# Patient Record
Sex: MALE | Race: WHITE | Employment: OTHER | URBAN - METROPOLITAN AREA
[De-identification: names, ages, dates, MRNs, and addresses within clinical notes are randomized per-mention and may not be internally consistent; named-entity substitution may affect disease eponyms.]

---

## 2024-01-02 ENCOUNTER — HOSPITAL ENCOUNTER (EMERGENCY)
Age: 89
Discharge: HOME | End: 2024-01-02
Attending: EMERGENCY MEDICINE

## 2024-01-02 ENCOUNTER — APPOINTMENT (OUTPATIENT)
Dept: GENERAL RADIOLOGY | Age: 89
End: 2024-01-02

## 2024-01-02 ENCOUNTER — APPOINTMENT (OUTPATIENT)
Dept: CT IMAGING | Age: 89
End: 2024-01-02

## 2024-01-02 VITALS
WEIGHT: 180 LBS | TEMPERATURE: 97 F | SYSTOLIC BLOOD PRESSURE: 123 MMHG | OXYGEN SATURATION: 96 % | DIASTOLIC BLOOD PRESSURE: 60 MMHG | HEIGHT: 69 IN | BODY MASS INDEX: 26.66 KG/M2 | HEART RATE: 73 BPM | RESPIRATION RATE: 18 BRPM

## 2024-01-02 DIAGNOSIS — R42 VERTIGO: ICD-10-CM

## 2024-01-02 DIAGNOSIS — R42 DIZZINESS: Primary | ICD-10-CM

## 2024-01-02 LAB
ALBUMIN SERPL-MCNC: 4.3 G/DL (ref 3.5–4.6)
ALP SERPL-CCNC: 100 U/L (ref 35–104)
ALT SERPL-CCNC: 49 U/L (ref 0–41)
ANION GAP SERPL CALCULATED.3IONS-SCNC: 13 MEQ/L (ref 9–15)
AST SERPL-CCNC: 51 U/L (ref 0–40)
BASOPHILS # BLD: 0 K/UL (ref 0–0.1)
BASOPHILS NFR BLD: 0.4 % (ref 0.2–1.2)
BILIRUB SERPL-MCNC: 0.5 MG/DL (ref 0.2–0.7)
BUN SERPL-MCNC: 21 MG/DL (ref 8–23)
CALCIUM SERPL-MCNC: 10.1 MG/DL (ref 8.5–9.9)
CHLORIDE SERPL-SCNC: 99 MEQ/L (ref 95–107)
CO2 SERPL-SCNC: 25 MEQ/L (ref 20–31)
CREAT SERPL-MCNC: 1.22 MG/DL (ref 0.7–1.2)
EOSINOPHIL # BLD: 0 K/UL (ref 0–0.5)
EOSINOPHIL NFR BLD: 0.3 % (ref 0.8–7)
ERYTHROCYTE [DISTWIDTH] IN BLOOD BY AUTOMATED COUNT: 12.4 % (ref 11.6–14.4)
GLOBULIN SER CALC-MCNC: 3 G/DL (ref 2.3–3.5)
GLUCOSE SERPL-MCNC: 160 MG/DL (ref 70–99)
HCT VFR BLD AUTO: 42.3 % (ref 42–52)
HGB BLD-MCNC: 14.1 G/DL (ref 13.7–17.5)
IMM GRANULOCYTES # BLD: 0.1 K/UL
IMM GRANULOCYTES NFR BLD: 0.8 %
LYMPHOCYTES # BLD: 0.8 K/UL (ref 1.3–3.6)
LYMPHOCYTES NFR BLD: 9.3 %
MCH RBC QN AUTO: 29.7 PG (ref 25.7–32.2)
MCHC RBC AUTO-ENTMCNC: 33.3 % (ref 32.3–36.5)
MCV RBC AUTO: 89.1 FL (ref 79–92.2)
MONOCYTES # BLD: 0.6 K/UL (ref 0.3–0.8)
MONOCYTES NFR BLD: 6.9 % (ref 5.3–12.2)
NEUTROPHILS # BLD: 7.4 K/UL (ref 1.8–5.4)
NEUTS SEG NFR BLD: 82.3 % (ref 34–67.9)
PLATELET # BLD AUTO: 223 K/UL (ref 163–337)
POTASSIUM SERPL-SCNC: 4.2 MEQ/L (ref 3.4–4.9)
PROT SERPL-MCNC: 7.3 G/DL (ref 6.3–8)
RBC # BLD AUTO: 4.75 M/UL (ref 4.63–6.08)
SARS-COV-2 RDRP RESP QL NAA+PROBE: NOT DETECTED
SODIUM SERPL-SCNC: 137 MEQ/L (ref 135–144)
TROPONIN, HIGH SENSITIVITY: 22 NG/L (ref 0–19)
TROPONIN, HIGH SENSITIVITY: 22 NG/L (ref 0–19)
TROPONIN, HIGH SENSITIVITY: 23 NG/L (ref 0–19)
WBC # BLD AUTO: 9 K/UL (ref 4.2–9)

## 2024-01-02 PROCEDURE — 80053 COMPREHEN METABOLIC PANEL: CPT

## 2024-01-02 PROCEDURE — 93005 ELECTROCARDIOGRAM TRACING: CPT

## 2024-01-02 PROCEDURE — 84484 ASSAY OF TROPONIN QUANT: CPT

## 2024-01-02 PROCEDURE — 36415 COLL VENOUS BLD VENIPUNCTURE: CPT

## 2024-01-02 PROCEDURE — 70450 CT HEAD/BRAIN W/O DYE: CPT

## 2024-01-02 PROCEDURE — 99285 EMERGENCY DEPT VISIT HI MDM: CPT

## 2024-01-02 PROCEDURE — 87635 SARS-COV-2 COVID-19 AMP PRB: CPT

## 2024-01-02 PROCEDURE — 71045 X-RAY EXAM CHEST 1 VIEW: CPT

## 2024-01-02 PROCEDURE — 85025 COMPLETE CBC W/AUTO DIFF WBC: CPT

## 2024-01-02 PROCEDURE — 2580000003 HC RX 258: Performed by: EMERGENCY MEDICINE

## 2024-01-02 PROCEDURE — 6370000000 HC RX 637 (ALT 250 FOR IP): Performed by: EMERGENCY MEDICINE

## 2024-01-02 RX ORDER — ONDANSETRON 4 MG/1
4 TABLET, ORALLY DISINTEGRATING ORAL EVERY 4 HOURS PRN
Qty: 8 TABLET | Refills: 0 | Status: SHIPPED | OUTPATIENT
Start: 2024-01-02

## 2024-01-02 RX ORDER — MECLIZINE HCL 12.5 MG/1
25 TABLET ORAL ONCE
Status: COMPLETED | OUTPATIENT
Start: 2024-01-02 | End: 2024-01-02

## 2024-01-02 RX ORDER — 0.9 % SODIUM CHLORIDE 0.9 %
500 INTRAVENOUS SOLUTION INTRAVENOUS ONCE
Status: COMPLETED | OUTPATIENT
Start: 2024-01-02 | End: 2024-01-02

## 2024-01-02 RX ORDER — MECLIZINE HYDROCHLORIDE 25 MG/1
25 TABLET ORAL EVERY 6 HOURS PRN
Qty: 15 TABLET | Refills: 0 | Status: SHIPPED | OUTPATIENT
Start: 2024-01-02 | End: 2024-01-06

## 2024-01-02 RX ADMIN — MECLIZINE 25 MG: 12.5 TABLET ORAL at 16:22

## 2024-01-02 RX ADMIN — SODIUM CHLORIDE 500 ML: 9 INJECTION, SOLUTION INTRAVENOUS at 16:24

## 2024-01-02 ASSESSMENT — LIFESTYLE VARIABLES
HOW OFTEN DO YOU HAVE A DRINK CONTAINING ALCOHOL: NEVER
HOW MANY STANDARD DRINKS CONTAINING ALCOHOL DO YOU HAVE ON A TYPICAL DAY: PATIENT DOES NOT DRINK

## 2024-01-02 ASSESSMENT — ENCOUNTER SYMPTOMS
COUGH: 0
NAUSEA: 1
VOMITING: 1
BACK PAIN: 0
SORE THROAT: 0
EYE DISCHARGE: 0
SHORTNESS OF BREATH: 0
ABDOMINAL PAIN: 0
DIARRHEA: 1
EYE REDNESS: 0
COLOR CHANGE: 0
SINUS PAIN: 0

## 2024-01-02 ASSESSMENT — PAIN - FUNCTIONAL ASSESSMENT: PAIN_FUNCTIONAL_ASSESSMENT: NONE - DENIES PAIN

## 2024-01-02 NOTE — ED TRIAGE NOTES
Pt a/o x 3 skin pink w/d resp non labored. Pt reports dizziness yesterday and was unsteady on feet. Pt this am had diarrhea and small emesis. Pt denies pain. Neuro unremarkable.pt to have PET scan tomorrow to look for metastatic prostate ca.

## 2024-01-02 NOTE — ED PROVIDER NOTES
10/10/2023), initial troponin elevated 22, COVID screening is negative  Repeat troponin level was sent stable at 22  Third troponin was sent and pending    All other labs were within normal range or not returned as of this dictation.    EMERGENCY DEPARTMENT COURSE and DIFFERENTIAL DIAGNOSIS/MDM:   Vitals:    Vitals:    01/02/24 1459 01/02/24 1617   BP: (!) 145/87    Pulse: 71    Resp: 17 16   Temp: 97 °F (36.1 °C)    TempSrc: Oral    SpO2: 100% 97%   Weight: 81.6 kg (180 lb)    Height: 1.753 m (5' 9\")      Orthostatic Vitals:      1/2/2024     4:17 PM   Orthostatic Vitals   Orthostatic B/P and Pulse? Yes   Blood Pressure Lying 133/72   Pulse Lying 55 PER MINUTE   Blood Pressure Sitting 155/70   Pulse Sitting 58 PER MINUTE   Blood Pressure Standing 126/71   Pulse Standing 60 PER MINUTE        Treatment and course: Patient was placed on a cardiac monitor with continuous pulse ox monitoring and IV was established normal saline 500 mL IV bolus was given along with Antivert 25 mg p.o., orthostatic blood pressures were obtained and stable    Resting comfortably asymptomatic throughout the emergency department course neurologically intact.  Repeat troponin is stable (awaiting 3rd troponin) EKG is normal patient stable for outpatient follow-up    With the oncoming ER physician in the event there is elevation in the troponin to intervene otherwise patient will be set for discharge home I did prepare home-going instruction and prescription.    FINAL IMPRESSION      1. Dizziness    2. Vertigo          DISPOSITION/PLAN   DISPOSITION Decision To Discharge 01/02/2024 07:21:16 PM  Patient set for discharge home if 3rd troponin remains stable, advised rest increase fluids rise slowly from a seated position going prescription for Antivert was written monitor closely and return if any change or worsening any headache double blurry vision recurrent/worsening dizziness chest pain palpitations.  Patient to follow-up with his primary

## 2024-01-03 LAB
EKG ATRIAL RATE: 60 BPM
EKG P AXIS: 79 DEGREES
EKG P-R INTERVAL: 196 MS
EKG Q-T INTERVAL: 436 MS
EKG QRS DURATION: 96 MS
EKG QTC CALCULATION (BAZETT): 436 MS
EKG R AXIS: -25 DEGREES
EKG T AXIS: 15 DEGREES
EKG VENTRICULAR RATE: 60 BPM

## 2024-01-03 NOTE — ED NOTES
First time RN to see pt   Pt is alert and oriented     Pt in no distress at time of discharge     Discharge instructions given to and explained to pt. Pt verbalized understanding and denies questions at this time.  Pt stable at discharge.      IV dc'd cath intact.  No signs of redness, swelling, or bleeding.  Dressing applied.

## 2025-02-04 ENCOUNTER — HOSPITAL ENCOUNTER (OUTPATIENT)
Dept: PHYSICAL THERAPY | Age: 89
Setting detail: THERAPIES SERIES
Discharge: HOME | End: 2025-02-04
Payer: MEDICARE

## 2025-02-04 PROCEDURE — 97110 THERAPEUTIC EXERCISES: CPT

## 2025-02-04 PROCEDURE — 97162 PT EVAL MOD COMPLEX 30 MIN: CPT

## 2025-02-04 PROCEDURE — 97530 THERAPEUTIC ACTIVITIES: CPT

## 2025-02-04 ASSESSMENT — PAIN SCALES - GENERAL: PAINLEVEL_OUTOF10: 0

## 2025-02-04 ASSESSMENT — PAIN DESCRIPTION - LOCATION: LOCATION: ANKLE

## 2025-02-04 ASSESSMENT — PAIN DESCRIPTION - ORIENTATION: ORIENTATION: RIGHT

## 2025-02-04 ASSESSMENT — PAIN DESCRIPTION - DESCRIPTORS: DESCRIPTORS: ACHING

## 2025-02-04 NOTE — PROGRESS NOTES
Therapy is both indicated and medically necessary as outlined in the POC to increase the likelihood of meeting the functionally related goals stated below.     Patient's Activity Tolerance:        Patient's rehabilitation potential/prognosis is considered to be: Good    Factors which may impact rehabilitation potential include:          GOALS   Patient Goal(s): improve strength and balance and avoid surgery on R ankle if possible  Short Term Goals Completed by 2-3 wks Goal Status   Pt demonstrate I with HEP and report compliance with HEP 4/7 days per wk     Pt amb 200 feet or greater with appropriate AD demonstrating good balance and near equal step length                                                         Long Term Goals Completed by 4-6 wks Goal Status   Imrpove FAAM from 45 % at time of IE to 25% or less at time of D/C     Improve LE strength 4 to 4+/5 or greater to improve walking ability     Pt amb 500 feet or greater distance with least restrictive device demonstrating good balance     Pt report no falls and no major LOB within a 4 week period     Pt I with advanced HEP to further improve strength and balance     Improve SLS 5 sec or greater R and L LE without UE support to imrpove overall balance                                  TREATMENT PLAN            Pt. actively involved in establishing Plan of Care and Goals: Yes  Patient/ Caregiver education and instruction: Goals, PT Role, Plan of Care, Evaluative findings, Home Exercise Program, Fall prevention strategies             Treatment may include any combination of the following: Current Treatment Recommendations: Strengthening, Balance training, Functional mobility training, Home exercise program, Gait training, Safety education & training, Patient/Caregiver education & training, Manual, Neuromuscular re-education     Frequency / Duration:  Patient to be seen 1-2x per wk for 4-6 wks weeks      Eval Complexity: Overall Evaluation : Medium       PT

## 2025-02-06 ENCOUNTER — HOSPITAL ENCOUNTER (OUTPATIENT)
Dept: PHYSICAL THERAPY | Age: 89
Setting detail: THERAPIES SERIES
Discharge: HOME | End: 2025-02-06
Payer: MEDICARE

## 2025-02-06 PROCEDURE — 97110 THERAPEUTIC EXERCISES: CPT

## 2025-02-06 PROCEDURE — 97112 NEUROMUSCULAR REEDUCATION: CPT

## 2025-02-06 ASSESSMENT — PAIN SCALES - GENERAL: PAINLEVEL_OUTOF10: 0

## 2025-02-06 NOTE — PROGRESS NOTES
Physical Therapy: Daily Note   Patient: Prasanna Dodd (89 y.o. male)   Examination Date: 2025  Plan of Care/Certification Expiration Date: 25    No data recorded   :  1934 # of Visits since SOC:   2   MRN: [id number]  CSN: [id number] Start of Care Date:   2025   Insurance: Payor: [employer] / Plan: [employer] SELECT PPO / Product Type: *No Product type* /   Insurance ID: [id number] - (Medicare Managed) Secondary Insurance (if applicable):    Referring Physician: Raymon Da Silva MD Dr. Elcher   PCP: Anup Smith MD Visits to Date/Visits Approved: 2 / 10    No Show/Cancelled Appts: 0 / 0     Medical Diagnosis: Malignant neoplasm of prostate [C61]  Pain in right ankle and joints of right foot [M25.571] prostate CA, chronic apain R ankle  Treatment Diagnosis: Chronic R ankle pain, ankle weakness, unsteady gait        SUBJECTIVE EXAMINATION   Pain Level: Pain Screening  Pain Assessment: 0-10  Pain Level: 0    Patient Comments: Subjective: Pt reports no pain upon starting therapy session. Pt brought band and HEP to review.    HEP Compliance: Good        OBJECTIVE EXAMINATION   Restrictions:  No data recorded No data recorded No data recorded              TREATMENT     Exercises:  Therapeutic exercise (CPT 75984)   Treatment Reasoning    Exercise 1: Tband pf x 15 H 3 GTB  Exercise 2: Tband df x 15 H 3 (GTB  Exercise 3: TBand inv x 15 GTB H 3  Exercise 4: TBand ev x 15 GTB H 3  Exercise 5: SLR x 10 H3 R and L  Exercise 6: BAPS L2 x15 DF/PF, clockwise, counterclockwise x 15 ea.  Exercise 7: Mini squats x 15 with UE support  Exercise 8: hip AB x10 ea with UE support  Exercise 9: seated ankle PFL isometrics against ball x 15                          Neuromuscular Re-Education: (CPT 78533) Treatment Reasoning    Neuromuscular Re-education (CPT 33766)  Neuro Re-Ed Exercise 1: SLS x15 sec for 3 sets B LE CGA/SBA for safety and UE support  Neuro Re-Ed Exercise 2: tandem

## 2025-02-10 ENCOUNTER — HOSPITAL ENCOUNTER (OUTPATIENT)
Dept: PHYSICAL THERAPY | Age: 89
Setting detail: THERAPIES SERIES
Discharge: HOME | End: 2025-02-10
Payer: MEDICARE

## 2025-02-10 PROCEDURE — 97110 THERAPEUTIC EXERCISES: CPT

## 2025-02-10 NOTE — PROGRESS NOTES
Physical Therapy: Daily Note   Patient: Prasanna Dodd (89 y.o. male)   Examination Date: 02/10/2025  Plan of Care/Certification Expiration Date: 25    No data recorded   :  1934 # of Visits since SOC:   3   MRN: [id number]  CSN: [id number] Start of Care Date:   2025   Insurance: Payor: [employer] / Plan: [employer] SELECT PPO / Product Type: *No Product type* /   Insurance ID: [id number] - (Medicare Managed) Secondary Insurance (if applicable):    Referring Physician: Raymon Da Silva MD Dr. Elcher   PCP: Anup Smith MD Visits to Date/Visits Approved: 3 / 10    No Show/Cancelled Appts: 0 / 0     Medical Diagnosis: Malignant neoplasm of prostate [C61]  Pain in right ankle and joints of right foot [M25.571]    Treatment Diagnosis: Chronic R ankle pain, ankle weakness, unsteady gait        SUBJECTIVE EXAMINATION   Pain Level:      Patient Comments: Subjective: Pt reports no c/o pain.    HEP Compliance: Good        OBJECTIVE EXAMINATION   Restrictions:  No data recorded No data recorded No data recorded      TREATMENT     Exercises:      Treatment Reasoning    Exercise 1: Tband pf x 15 H 3 GTB  Exercise 2: Tband df x 15 H 3 (GTB)  Exercise 3: TBand inv x 15 GTB H 3  Exercise 4: TBand ev x 15 GTB H 3  Exercise 5: SLR x 15 H3 R and L  Exercise 6: BAPS L2 x15 DF/PF,IV/EV clockwise, counterclockwise x 15 ea.  Exercise 10: *VMO x 10 B LE  Exercise 11: *Heel/toe raises x 10  Exercise 12: *Sidelying abduction x 10 B LE  Exercise 13: *SLS x 2-3 without UE support; with 1-2 fingers on blue pad     Strength and balance                      Pt Education:  Given HEP        ASSESSMENT     Assessment: Assessment: Pt arrives to his appt with no c/o pain. Pt report he feel more weakness than pain. Pt performed his Tband ther ex needing occasion cues for set up. PT then added 2 additonal supine (VMO and sidelying ABD) and then progress with balance on blue pain. Also added standing

## 2025-02-14 ENCOUNTER — HOSPITAL ENCOUNTER (OUTPATIENT)
Dept: PHYSICAL THERAPY | Age: 89
Setting detail: THERAPIES SERIES
Discharge: HOME | End: 2025-02-14
Payer: MEDICARE

## 2025-02-14 PROCEDURE — 97112 NEUROMUSCULAR REEDUCATION: CPT

## 2025-02-14 PROCEDURE — 97110 THERAPEUTIC EXERCISES: CPT

## 2025-02-14 NOTE — PROGRESS NOTES
Physical Therapy: Daily Note   Patient: Prasanna Dodd (89 y.o. male)   Examination Date: 2025  Plan of Care/Certification Expiration Date: 25    No data recorded   :  1934 # of Visits since SOC:   4   MRN: [id number]  CSN: [id number] Start of Care Date:   2025   Insurance: Payor: [employer]AGE / Plan: [employer] SELECT PPO / Product Type: *No Product type* /   Insurance ID: [id number] - (Medicare Managed) Secondary Insurance (if applicable):    Referring Physician: Raymon Da Silva MD     PCP: Anup Smith MD Visits to Date/Visits Approved: 4 / 10    No Show/Cancelled Appts: 0  0     Medical Diagnosis: Malignant neoplasm of prostate [C61]  Pain in right ankle and joints of right foot [M25.571]    Treatment Diagnosis: Chronic R ankle pain, ankle weakness, unsteady gait        SUBJECTIVE EXAMINATION   Pain Level:      Patient Comments: Subjective: Pt presetns to therpay with no complaints of pain, reports mild decrease in ankle swelling and maybe noticing slight increased strength in ankle    HEP Compliance: Good        OBJECTIVE EXAMINATION       TREATMENT     Exercises:      Treatment Reasoning    Exercise 1: Tband pf x 10 H 3 GTB  Exercise 2: Tband df x 10 H 3 (GTB)  Exercise 3: TBand inv x 10 GTB H 3  Exercise 4: TBand ev x 10 GTB H 3-vc and tactile cues for form  Exercise 5: SLR x 10 H3 R and L  Exercise 6: BAPS L2 x10-15  DF/PF,IV/EV clockwise, counterclockwise x 10-15 ea.  Exercise 7: Mini squats x 10 with light UE support  Exercise 8: sidelying hip abd x 10- vcs and tactile for form  Exercise 10: *VMO x 10 B LE  Exercise 11: *Heel/toe raises x 10 H 2-3 fingertips support for balance  Exercise 12: sidelying hip abd x 10- vcs and tactile for form                          Neuromuscular Re-Education: (CPT 08616) Treatment Reasoning    Neuromuscular Re-education (CPT 94956)  Neuro Re-Ed Exercise 1: SLS on blue foam R and L with fingertips supprot as needed and

## 2025-02-17 ENCOUNTER — APPOINTMENT (OUTPATIENT)
Dept: PHYSICAL THERAPY | Age: 89
End: 2025-02-17
Payer: MEDICARE

## 2025-02-18 ENCOUNTER — HOSPITAL ENCOUNTER (OUTPATIENT)
Dept: PHYSICAL THERAPY | Age: 89
Setting detail: THERAPIES SERIES
Discharge: HOME | End: 2025-02-18
Payer: MEDICARE

## 2025-02-18 PROCEDURE — 97110 THERAPEUTIC EXERCISES: CPT

## 2025-02-18 PROCEDURE — 97140 MANUAL THERAPY 1/> REGIONS: CPT

## 2025-02-18 PROCEDURE — 97116 GAIT TRAINING THERAPY: CPT

## 2025-02-18 NOTE — PROGRESS NOTES
Physical Therapy  Physical Therapy: Daily Note   Patient: Prasanna Dodd (89 y.o. male)   Examination Date: 2025  Plan of Care/Certification Expiration Date: 25    No data recorded   :  1934 # of Visits since SOC:   5   MRN: [id number]  CSN: [id number] Start of Care Date:   2025   Insurance: Payor: [employer] MEDICARE ADVANTAGE / Plan: [employer] SELECT PPO / Product Type: *No Product type* /   Insurance ID: [id number] - (Medicare Managed) Secondary Insurance (if applicable):    Referring Physician: Raymon Da Silva MD Dr. Elcher   PCP: Anup Smith MD Visits to Date/Visits Approved: 5 / 10    No Show/Cancelled Appts: 0  0     Medical Diagnosis: Malignant neoplasm of prostate [C61]  Pain in right ankle and joints of right foot [M25.571]    Treatment Diagnosis: Chronic R ankle pain, ankle weakness, unsteady gait        SUBJECTIVE EXAMINATION   Pain Level: Pain Screening  Patient Currently in Pain: No    Patient Comments: Subjective: Pt. states his exercises are going well.  Reports walked in with walking stick but in the waiting room.    HEP Compliance: Good        OBJECTIVE EXAMINATION   Restrictions:  No data recorded No data recorded No data recorded              TREATMENT     Exercises:      Treatment Reasoning    Exercise 1: Tband pf x 10 H 3 GTB  Exercise 2: Tband df x 10 H 3 (GTB)  Exercise 3: TBand inv x 10 GTB H 3  Exercise 4: TBand ev x 10 GTB H 3-vc and tactile cues for form  Exercise 5: SLR x 10 H3 R and L  Exercise 8: sidelying hip abd x 10- vcs and tactile for form  Exercise 10: *VMO x 10 B LE    Limitations addressed: Mobility, Strength                     Neuromuscular Re-Education: (CPT 52499) Treatment Reasoning    Neuromuscular Re-education (CPT 80371)  Neuro Re-Ed Exercise 4: Balance activie on blue mat (one large thin blue mat and one thicker more uneven blue mat going from one surface to the next /uneven surface- forward retro walking side stepping and

## 2025-02-20 ENCOUNTER — HOSPITAL ENCOUNTER (OUTPATIENT)
Dept: PHYSICAL THERAPY | Age: 89
Setting detail: THERAPIES SERIES
Discharge: HOME | End: 2025-02-20
Payer: MEDICARE

## 2025-02-20 PROCEDURE — 97112 NEUROMUSCULAR REEDUCATION: CPT

## 2025-02-20 PROCEDURE — 97110 THERAPEUTIC EXERCISES: CPT

## 2025-02-20 NOTE — PROGRESS NOTES
Physical Therapy: Daily Note   Patient: Prasanna Dodd (89 y.o. male)   Examination Date: 2025  Plan of Care/Certification Expiration Date: 25    No data recorded   :  1934 # of Visits since SOC:   6   MRN: [id number]  CSN: [id number] Start of Care Date:   2025   Insurance: Payor: [employer]AGE / Plan: [employer]AGE SELECT PPO / Product Type: *No Product type* /   Insurance ID: [id number] - (Medicare Managed) Secondary Insurance (if applicable):    Referring Physician: Raymon Da Silva MD Dr. Elcher   PCP: Anup Smith MD Visits to Date/Visits Approved: 6 / 10    No Show/Cancelled Appts: 0 / 0     Medical Diagnosis: Malignant neoplasm of prostate (HCC) [C61]  Pain in right ankle and joints of right foot [M25.571]    Treatment Diagnosis: Chronic R ankle pain, ankle weakness, unsteady gait        SUBJECTIVE EXAMINATION   Pain Level:      Patient Comments: Subjective: Pt states, \"Balance is a huge issue.\" Pt states he has been doing his HEP on days he does not have therapy.    HEP Compliance: Good            TREATMENT     Exercises:      Treatment Reasoning    Exercise 7: Mini squats BTB  x 10 with light UE support  Exercise 11: *Heel/toe raises x 10 H 3-5'' fingertips support for balance  Exercise 13: SLS 0 UE assist 9'' at best LLE, RLE 2'' at best  Exercise 14: tandem stance;  LLE behind 10'' at best, RLE behind 18'' at best  Exercise 15: NBOS standing on blue pad 0 UE assist with head flex/ext/roations 2 LOB's with self correction  Exercise 16: fwd/retro lateral gait YTB at knees  x 10  Exercise 17: B shoulder extension H3'' x 10 RTB wide GERTRUDE  Exercise 18: mid rows with NBOS RTB H3'' x 10   Nautilus 4 way resisted walking 20# x 5 ea direction Limitations addressed: Mobility, Strength                     Pt Education: Additional Comments: active metastatic prostate CA, taking hormone therapy for treatment       ASSESSMENT     Assessment: Assessment: Pt progressing

## 2025-02-21 ENCOUNTER — APPOINTMENT (OUTPATIENT)
Dept: PHYSICAL THERAPY | Age: 89
End: 2025-02-21
Payer: MEDICARE

## 2025-02-25 ENCOUNTER — HOSPITAL ENCOUNTER (OUTPATIENT)
Dept: PHYSICAL THERAPY | Age: 89
Setting detail: THERAPIES SERIES
Discharge: HOME | End: 2025-02-25
Payer: MEDICARE

## 2025-02-25 PROCEDURE — 97110 THERAPEUTIC EXERCISES: CPT

## 2025-02-25 PROCEDURE — 97140 MANUAL THERAPY 1/> REGIONS: CPT

## 2025-02-25 NOTE — PROGRESS NOTES
Physical Therapy  Daily Treatment Note  Date: 2025  Patient Name: Prasanna Dodd  MRN: [id number]     :   1934    Referring Physician: Raymon Da Silva MD Dr. Elcher   PCP: Anup Smith MD    Medical Diagnosis: Malignant neoplasm of prostate (HCC) [C61]  Pain in right ankle and joints of right foot [M25.571]    Treatment Diagnosis: Chronic R ankle pain, ankle weakness, unsteady gait      Insurance: Payor: [employer]AGE / Plan: [employer] SELECT PPO / Product Type: *No Product type* /   Insurance ID: [id number] - (Medicare Managed)    Subjective:  General  Referring Provider (secondary): Dr. Bruner  PT Visit Information  Total # of Visits Approved: 10  Total # of Visits to Date: 7  Plan of Care/Certification Expiration Date: 25  No Show: 0  Canceled Appointment: 0  Referring Provider (secondary): Dr. Bruner  Subjective  Subjective: Pt. states he removed the tape on Saturday and felt good on with good support.  Pt. denies falls. Reports overdid it on Saturday with trying to stand on his toes.  \"I recovered.\"  Pain Screening  Patient Currently in Pain: No       Treatment Activities:  Exercises:      Treatment Reasoning    Exercise 1: Tband pf x 15 H 3 BTB  Exercise 2: Tband df x 15 H 3 BTB  Exercise 3: TBand inv x 15 BTB H 3  Exercise 4: TBand ev x 15 BTB H 3  Exercise 6: BAPS L4 x10 DF/PF,IV/EV clockwise, counterclockwise x 10 ea.  Exercise 13: SLS x 10\" occ BUE/ light UE support x 5 ea  Exercise 14: tandem stance 10-15 sec x 2 B  Exercise 16: fwd/retro lateral gait YTB at knees  x 10 LOB x 2 CGA and use of rail to correct    Limitations addressed: Mobility, Strength                     Manual Therapy: (CPT 02174) Treatment Reasoning     Other: KT tape to R ankle stirrup crossed in front of ankle and up malleoli 50% tension.  2 nd strip under foot with proximal pull up lateral and medial malleoli 50% tension.  3rd strip horizontal across B malleoli 100% tension for

## 2025-02-27 ENCOUNTER — HOSPITAL ENCOUNTER (OUTPATIENT)
Dept: PHYSICAL THERAPY | Age: 89
Setting detail: THERAPIES SERIES
Discharge: HOME | End: 2025-02-27
Payer: MEDICARE

## 2025-02-27 NOTE — PROGRESS NOTES
Physical Therapy  Daily Treatment Note  Date: 2025  Patient Name: Prasanna Dodd  MRN: [id number]     :   1934    Referring Physician: Raymon Da Silva MD Dr. Elcher   PCP: Anup Smith MD    Medical Diagnosis: Malignant neoplasm of prostate (HCC) [C61]  Pain in right ankle and joints of right foot [M25.571]    Treatment Diagnosis: Chronic R ankle pain, ankle weakness, unsteady gait      Insurance: Payor: [employer]AGE / Plan: [employer] SELECT PPO / Product Type: *No Product type* /   Insurance ID: [id number] - (Medicare Managed)    Subjective:  General  Referring Provider (secondary): Dr. Bruner  PT Visit Information  Total # of Visits Approved: 10  Total # of Visits to Date: 7  Plan of Care/Certification Expiration Date: 25  No Show: 0  Canceled Appointment: 1  Referring Provider (secondary): Dr. Bruner  Subjective     Pt. cancelled he did something to his knee.            Therapy Time:   Individual Concurrent Group Co-treatment   Time In           Time Out           Minutes  0                 Jennifer Stuart PTA

## 2025-03-04 ENCOUNTER — HOSPITAL ENCOUNTER (OUTPATIENT)
Dept: PHYSICAL THERAPY | Age: 89
Setting detail: THERAPIES SERIES
Discharge: HOME | End: 2025-03-04
Payer: MEDICARE

## 2025-03-04 PROCEDURE — 97530 THERAPEUTIC ACTIVITIES: CPT

## 2025-03-04 PROCEDURE — 97110 THERAPEUTIC EXERCISES: CPT

## 2025-03-04 NOTE — PROGRESS NOTES
Physical Therapy: Monthly Recheck/ Discharge Summary   Patient: Prasanna Dodd (89 y.o. male)   Examination Date: 2025  Plan of Care/Certification Expiration Date: 25    No data recorded   :  1934 # of Visits since SOC:   8   MRN: [id number]  CSN: [id number] Start of Care Date:   2025   Insurance: Payor: [employer]AGE / Plan: [employer] SELECT PPO / Product Type: *No Product type* /   Insurance ID: [id number] - (Medicare Managed) Secondary Insurance (if applicable):    Referring Physician: Raymon Da Silva MD Dr. Elcher   PCP: Anup Smith MD Visits to Date/Visits Approved: 8 / 10    No Show/Cancelled Appts: 0      Medical Diagnosis: Malignant neoplasm of prostate (HCC) [C61]  Pain in right ankle and joints of right foot [M25.571] prostate CA, chronic apain R ankle  Treatment Diagnosis: Chronic R ankle pain, ankle weakness, unsteady gait        SUBJECTIVE EXAMINATION   Pain Level:  0    Patient Comments:  Pt reports no significant change in balance since starting therapy however denies any falls over the last 4 wks. Pt reports feeling legs in general are stronger, plans to get rollator soon. Pt cont to deny ankle pain     HEP Compliance: Good        OBJECTIVE EXAMINATION   Restrictions:  No data recorded No data recorded No data recorded    Ambulation/Gait (if applicable):   Ambulation  Surface: Level tile  Device: No Device  Assistance: Independent  Quality of Gait: Near equal step length, wide GERTRUDE good heel strike initially however with fatigue and distance less heel strike noted R compared to L, no LOB, mild veering occasionally R and L improved balance overall during gait, quickly turned to change direction wtihout LOB  Distance: 500 feet    Balance Screen:   Balance  Tandem Stance R Leg: 3-5 sec without UE support  Tandem Stance L Le-3 sec without  Single Stance R Le-2  Single Stance L Leg: 3-4 sec      Left Strength  Right Strength

## 2025-03-06 ENCOUNTER — APPOINTMENT (OUTPATIENT)
Dept: PHYSICAL THERAPY | Age: 89
End: 2025-03-06
Payer: MEDICARE